# Patient Record
Sex: MALE | Race: WHITE | NOT HISPANIC OR LATINO | Employment: FULL TIME | RURAL
[De-identification: names, ages, dates, MRNs, and addresses within clinical notes are randomized per-mention and may not be internally consistent; named-entity substitution may affect disease eponyms.]

---

## 2023-03-20 ENCOUNTER — TELEPHONE (OUTPATIENT)
Dept: FAMILY MEDICINE CLINIC | Facility: CLINIC | Age: 38
End: 2023-03-20

## 2023-03-20 NOTE — TELEPHONE ENCOUNTER
Caller: Yossi Sneed    Relationship: Self    Best call back number: [phone] (Mobile)      What was the call regarding:  [facility]UB SCHEDULED NEW PATIENT APPT WITH YOSSI, WIFE, AND 4 CHILDREN   ON 5-4-23 AND 5-5-23 WITH EMMANUELLE CARRILLO     IS THERE A WAY TO GET EVERYONE ON THE SAME DAY OR CLOSER TIMES ?     Do you require a callback: YES PLEASE